# Patient Record
Sex: MALE
[De-identification: names, ages, dates, MRNs, and addresses within clinical notes are randomized per-mention and may not be internally consistent; named-entity substitution may affect disease eponyms.]

---

## 2022-02-25 ENCOUNTER — NURSE TRIAGE (OUTPATIENT)
Dept: OTHER | Facility: CLINIC | Age: 22
End: 2022-02-25

## 2022-02-25 NOTE — TELEPHONE ENCOUNTER
Subjective: Caller states \"Think he broke big toe\"   Wondering what [facility] we can go to    Current Symptoms: Swelling, bruising of the big toe from injury several days ago    Onset: Several days ago and does a lot of walking, college student    Pain Severity:Unable to answer, patient not with caller    What has been tried: OTC pain meds    LMP: NA Pregnant: NA    Recommended disposition: Slava Young [id number] advice provided, patient verbalizes understanding; denies any other questions or concerns; instructed to call back for any new or worsening symptoms. Call with worsening symptoms    This triage is a result of a call to [address] [place] [address]. Please do not respond to the triage nurse through this encounter. Any subsequent communication should be directly with the patient.